# Patient Record
Sex: FEMALE | NOT HISPANIC OR LATINO | Employment: FULL TIME | URBAN - METROPOLITAN AREA
[De-identification: names, ages, dates, MRNs, and addresses within clinical notes are randomized per-mention and may not be internally consistent; named-entity substitution may affect disease eponyms.]

---

## 2023-07-11 ENCOUNTER — APPOINTMENT (OUTPATIENT)
Dept: LAB | Facility: LAB | Age: 28
End: 2023-07-11
Payer: COMMERCIAL

## 2024-01-03 ENCOUNTER — ANCILLARY PROCEDURE (OUTPATIENT)
Dept: RADIOLOGY | Facility: CLINIC | Age: 29
End: 2024-01-03
Payer: COMMERCIAL

## 2024-01-03 DIAGNOSIS — N64.4 MASTODYNIA: ICD-10-CM

## 2024-01-03 PROCEDURE — 76982 USE 1ST TARGET LESION: CPT | Mod: 50

## 2024-01-03 PROCEDURE — 76983 USE EA ADDL TARGET LESION: CPT

## 2024-01-03 PROCEDURE — 76642 ULTRASOUND BREAST LIMITED: CPT | Mod: BILATERAL PROCEDURE | Performed by: RADIOLOGY

## 2024-01-03 PROCEDURE — 76642 ULTRASOUND BREAST LIMITED: CPT | Mod: 50

## 2024-01-24 ENCOUNTER — APPOINTMENT (OUTPATIENT)
Dept: OPHTHALMOLOGY | Facility: CLINIC | Age: 29
End: 2024-01-24
Payer: COMMERCIAL

## 2024-02-13 ENCOUNTER — OFFICE VISIT (OUTPATIENT)
Dept: PRIMARY CARE | Facility: CLINIC | Age: 29
End: 2024-02-13
Payer: COMMERCIAL

## 2024-02-13 VITALS
HEIGHT: 64 IN | DIASTOLIC BLOOD PRESSURE: 83 MMHG | HEART RATE: 67 BPM | SYSTOLIC BLOOD PRESSURE: 128 MMHG | BODY MASS INDEX: 24.55 KG/M2 | WEIGHT: 143.8 LBS | TEMPERATURE: 98 F

## 2024-02-13 DIAGNOSIS — G47.8 UNHEALTHY SLEEP HABIT: ICD-10-CM

## 2024-02-13 DIAGNOSIS — Z00.00 ROUTINE GENERAL MEDICAL EXAMINATION AT A HEALTH CARE FACILITY: ICD-10-CM

## 2024-02-13 DIAGNOSIS — R53.83 OTHER FATIGUE: ICD-10-CM

## 2024-02-13 DIAGNOSIS — E55.9 VITAMIN D DEFICIENCY: ICD-10-CM

## 2024-02-13 DIAGNOSIS — Z20.2 STD EXPOSURE: Primary | ICD-10-CM

## 2024-02-13 PROCEDURE — 99395 PREV VISIT EST AGE 18-39: CPT | Performed by: FAMILY MEDICINE

## 2024-02-13 PROCEDURE — 87800 DETECT AGNT MULT DNA DIREC: CPT

## 2024-02-13 ASSESSMENT — PROMIS GLOBAL HEALTH SCALE
RATE_MENTAL_HEALTH: GOOD
EMOTIONAL_PROBLEMS: SOMETIMES
RATE_SOCIAL_SATISFACTION: GOOD
RATE_AVERAGE_FATIGUE: MODERATE
CARRYOUT_PHYSICAL_ACTIVITIES: MOSTLY
RATE_QUALITY_OF_LIFE: GOOD
RATE_GENERAL_HEALTH: FAIR
RATE_PHYSICAL_HEALTH: FAIR
CARRYOUT_SOCIAL_ACTIVITIES: FAIR
RATE_AVERAGE_PAIN: 4

## 2024-02-13 NOTE — PROGRESS NOTES
"Subjective   Patient ID: Cheyanne Henriquez is a 28 y.o. female who presents for Annual Exam.    HPI     Review of Systems   All other systems reviewed and are negative.      Objective   /83   Pulse 67   Temp 36.7 °C (98 °F)   Ht 1.626 m (5' 4\")   Wt 65.2 kg (143 lb 12.8 oz)   BMI 24.68 kg/m²     Physical Exam  Cardiovascular:      Rate and Rhythm: Normal rate.   Pulmonary:      Breath sounds: Normal breath sounds.   Abdominal:      Palpations: Abdomen is soft.   Musculoskeletal:         General: Normal range of motion.      Cervical back: Normal range of motion.   Skin:     General: Skin is warm.   Neurological:      General: No focal deficit present.      Mental Status: She is alert.   Psychiatric:         Mood and Affect: Mood normal.     Assessment/Plan     This is a 28-year-old female patient who is here for her annual physical    She is being followed for bilateral breast cyst    Most recent ultrasound shows a cyst on her right breast which she will be following up with her gynecologist for repeat ultrasound of her right breast    She says she is feeling fatigue    On further questioning I found out that she has very erratic sleep schedule referred her to sleep psychology to learn about healthy sleep habits    Will check her routine lab work including the STD labs  She will go to [facility] to get her blood drawn but here we will send the urine for chlamydia and gonorrhea    Advised on safe sex barrier techniques    Advised her to exercise more regularly    Advised her to come back in 3 to 6 months         "

## 2024-02-15 LAB
C TRACH RRNA SPEC QL NAA+PROBE: NEGATIVE
N GONORRHOEA DNA SPEC QL PROBE+SIG AMP: NEGATIVE

## 2024-02-19 ENCOUNTER — LAB (OUTPATIENT)
Dept: LAB | Facility: LAB | Age: 29
End: 2024-02-19
Payer: COMMERCIAL

## 2024-02-19 DIAGNOSIS — Z00.00 ROUTINE GENERAL MEDICAL EXAMINATION AT A HEALTH CARE FACILITY: ICD-10-CM

## 2024-02-19 DIAGNOSIS — E55.9 VITAMIN D DEFICIENCY: ICD-10-CM

## 2024-02-19 DIAGNOSIS — R53.83 OTHER FATIGUE: ICD-10-CM

## 2024-02-19 DIAGNOSIS — Z20.2 STD EXPOSURE: ICD-10-CM

## 2024-02-19 LAB
25(OH)D3 SERPL-MCNC: 21 NG/ML (ref 30–100)
ALBUMIN SERPL BCP-MCNC: 4.3 G/DL (ref 3.4–5)
ALP SERPL-CCNC: 46 U/L (ref 33–110)
ALT SERPL W P-5'-P-CCNC: 9 U/L (ref 7–45)
ANION GAP SERPL CALC-SCNC: 12 MMOL/L (ref 10–20)
AST SERPL W P-5'-P-CCNC: 13 U/L (ref 9–39)
BASOPHILS # BLD AUTO: 0.04 X10*3/UL (ref 0–0.1)
BASOPHILS NFR BLD AUTO: 0.6 %
BILIRUB SERPL-MCNC: 0.3 MG/DL (ref 0–1.2)
BUN SERPL-MCNC: 12 MG/DL (ref 6–23)
CALCIUM SERPL-MCNC: 9.8 MG/DL (ref 8.6–10.3)
CHLORIDE SERPL-SCNC: 101 MMOL/L (ref 98–107)
CHOLEST SERPL-MCNC: 145 MG/DL (ref 0–199)
CHOLESTEROL/HDL RATIO: 3.1
CO2 SERPL-SCNC: 28 MMOL/L (ref 21–32)
CREAT SERPL-MCNC: 0.62 MG/DL (ref 0.5–1.05)
EGFRCR SERPLBLD CKD-EPI 2021: >90 ML/MIN/1.73M*2
EOSINOPHIL # BLD AUTO: 0.54 X10*3/UL (ref 0–0.7)
EOSINOPHIL NFR BLD AUTO: 8.5 %
ERYTHROCYTE [DISTWIDTH] IN BLOOD BY AUTOMATED COUNT: 12 % (ref 11.5–14.5)
GLUCOSE SERPL-MCNC: 107 MG/DL (ref 74–99)
HBV SURFACE AG SERPL QL IA: NONREACTIVE
HCT VFR BLD AUTO: 38.6 % (ref 36–46)
HCV AB SER QL: NONREACTIVE
HDLC SERPL-MCNC: 47.1 MG/DL
HGB BLD-MCNC: 12.4 G/DL (ref 12–16)
HIV 1+2 AB+HIV1 P24 AG SERPL QL IA: NONREACTIVE
IMM GRANULOCYTES # BLD AUTO: 0.02 X10*3/UL (ref 0–0.7)
IMM GRANULOCYTES NFR BLD AUTO: 0.3 % (ref 0–0.9)
LDLC SERPL CALC-MCNC: 67 MG/DL
LYMPHOCYTES # BLD AUTO: 2.17 X10*3/UL (ref 1.2–4.8)
LYMPHOCYTES NFR BLD AUTO: 34.3 %
MCH RBC QN AUTO: 31.9 PG (ref 26–34)
MCHC RBC AUTO-ENTMCNC: 32.1 G/DL (ref 32–36)
MCV RBC AUTO: 99 FL (ref 80–100)
MONOCYTES # BLD AUTO: 0.53 X10*3/UL (ref 0.1–1)
MONOCYTES NFR BLD AUTO: 8.4 %
NEUTROPHILS # BLD AUTO: 3.02 X10*3/UL (ref 1.2–7.7)
NEUTROPHILS NFR BLD AUTO: 47.9 %
NON HDL CHOLESTEROL: 98 MG/DL (ref 0–149)
NRBC BLD-RTO: 0 /100 WBCS (ref 0–0)
PLATELET # BLD AUTO: 429 X10*3/UL (ref 150–450)
POTASSIUM SERPL-SCNC: 4.5 MMOL/L (ref 3.5–5.3)
PROT SERPL-MCNC: 6.9 G/DL (ref 6.4–8.2)
RBC # BLD AUTO: 3.89 X10*6/UL (ref 4–5.2)
SODIUM SERPL-SCNC: 136 MMOL/L (ref 136–145)
TRIGL SERPL-MCNC: 156 MG/DL (ref 0–149)
TSH SERPL-ACNC: 1.58 MIU/L (ref 0.44–3.98)
VLDL: 31 MG/DL (ref 0–40)
WBC # BLD AUTO: 6.3 X10*3/UL (ref 4.4–11.3)

## 2024-02-19 PROCEDURE — 85025 COMPLETE CBC W/AUTO DIFF WBC: CPT

## 2024-02-19 PROCEDURE — 87389 HIV-1 AG W/HIV-1&-2 AB AG IA: CPT

## 2024-02-19 PROCEDURE — 87340 HEPATITIS B SURFACE AG IA: CPT

## 2024-02-19 PROCEDURE — 84443 ASSAY THYROID STIM HORMONE: CPT

## 2024-02-19 PROCEDURE — 80053 COMPREHEN METABOLIC PANEL: CPT

## 2024-02-19 PROCEDURE — 80061 LIPID PANEL: CPT

## 2024-02-19 PROCEDURE — 82306 VITAMIN D 25 HYDROXY: CPT

## 2024-02-19 PROCEDURE — 86803 HEPATITIS C AB TEST: CPT

## 2024-02-20 ENCOUNTER — OFFICE VISIT (OUTPATIENT)
Dept: OPHTHALMOLOGY | Facility: CLINIC | Age: 29
End: 2024-02-20
Payer: COMMERCIAL

## 2024-02-20 DIAGNOSIS — Z83.511 FAMILY HISTORY OF GLAUCOMA: ICD-10-CM

## 2024-02-20 DIAGNOSIS — H18.523 ANTERIOR BASEMENT MEMBRANE DYSTROPHY OF BOTH EYES: ICD-10-CM

## 2024-02-20 DIAGNOSIS — H52.223 REGULAR ASTIGMATISM OF BOTH EYES: ICD-10-CM

## 2024-02-20 DIAGNOSIS — H52.13 MYOPIA, BILATERAL: ICD-10-CM

## 2024-02-20 DIAGNOSIS — H04.123 DRY EYES: Primary | ICD-10-CM

## 2024-02-20 PROCEDURE — 92015 DETERMINE REFRACTIVE STATE: CPT | Performed by: OPHTHALMOLOGY

## 2024-02-20 PROCEDURE — 92004 COMPRE OPH EXAM NEW PT 1/>: CPT | Performed by: OPHTHALMOLOGY

## 2024-02-20 ASSESSMENT — REFRACTION_MANIFEST
OS_AXIS: 020
OS_CYLINDER: -0.25
OD_CYLINDER: -0.50
OD_SPHERE: -4.50
OD_AXIS: 180
OS_SPHERE: -5.50

## 2024-02-20 ASSESSMENT — SLIT LAMP EXAM - LIDS
COMMENTS: GOOD POSITION
COMMENTS: GOOD POSITION

## 2024-02-20 ASSESSMENT — CUP TO DISC RATIO
OD_RATIO: 0.3
OS_RATIO: 0.3

## 2024-02-20 ASSESSMENT — REFRACTION_WEARINGRX
OD_CYLINDER: -0.75
OD_SPHERE: -4.25
OS_SPHERE: -4.75
OS_AXIS: 020
OS_CYLINDER: -0.75
OD_AXIS: 180

## 2024-02-20 ASSESSMENT — VISUAL ACUITY
METHOD: SNELLEN - LINEAR
OD_CC: 20/20
OS_PH_CC: 20/20
OS_CC: 20/25+

## 2024-02-20 ASSESSMENT — TONOMETRY
OS_IOP_MMHG: 16
OD_IOP_MMHG: 16
IOP_METHOD: GOLDMANN APPLANATION

## 2024-02-20 ASSESSMENT — EXTERNAL EXAM - RIGHT EYE: OD_EXAM: NORMAL

## 2024-02-20 ASSESSMENT — EXTERNAL EXAM - LEFT EYE: OS_EXAM: NORMAL

## 2024-02-20 ASSESSMENT — PACHYMETRY
OS_CT(UM): 547
OD_CT(UM): 548

## 2024-02-20 NOTE — PROGRESS NOTES
Assessment/Plan   Diagnoses and all orders for this visit:  Dry eyes  -Start artificial tears both eyes (OU) qid  -stress compliance    Anterior basement membrane dystrophy of both eyes  continue to monitor    Myopia, bilateral  Regular astigmatism of both eyes  Refractive error  -give Rx for new glasses    Family hx of glaucoma  -pt was advised to have all family members checked for glaucoma and also for pt to have a yearly exam    Return for a dilated exam in   12  months or sooner if having any problems

## 2024-04-08 ENCOUNTER — APPOINTMENT (OUTPATIENT)
Dept: OPHTHALMOLOGY | Facility: CLINIC | Age: 29
End: 2024-04-08
Payer: COMMERCIAL

## 2024-05-08 ENCOUNTER — OFFICE VISIT (OUTPATIENT)
Dept: PRIMARY CARE | Facility: CLINIC | Age: 29
End: 2024-05-08
Payer: COMMERCIAL

## 2024-05-08 VITALS
HEART RATE: 74 BPM | DIASTOLIC BLOOD PRESSURE: 71 MMHG | WEIGHT: 144.2 LBS | BODY MASS INDEX: 24.62 KG/M2 | SYSTOLIC BLOOD PRESSURE: 112 MMHG | TEMPERATURE: 98.3 F | HEIGHT: 64 IN

## 2024-05-08 DIAGNOSIS — R20.0 NUMBNESS AND TINGLING OF LEFT LOWER EXTREMITY: Primary | ICD-10-CM

## 2024-05-08 DIAGNOSIS — R20.2 NUMBNESS AND TINGLING OF LEFT LOWER EXTREMITY: Primary | ICD-10-CM

## 2024-05-08 DIAGNOSIS — R20.2 PARESTHESIA: ICD-10-CM

## 2024-05-08 PROCEDURE — 99214 OFFICE O/P EST MOD 30 MIN: CPT | Performed by: FAMILY MEDICINE

## 2024-05-08 PROCEDURE — 1036F TOBACCO NON-USER: CPT | Performed by: FAMILY MEDICINE

## 2024-05-08 ASSESSMENT — ENCOUNTER SYMPTOMS: NUMBNESS: 1

## 2024-05-08 NOTE — PROGRESS NOTES
"Subjective   Patient ID: Cheyanne Henriquez is a 29 y.o. female who presents for Numbness.    HPI     Review of Systems   Neurological:  Positive for numbness.        Patient for a number of years she used to have stiffness in her legs and she was told that she has bursitis and was given steroid shots    Since her last week she has numbness and tingling of both legs upper thigh area    Also feels heart at certain points of her thighs       Objective   /71   Pulse 74   Temp 36.8 °C (98.3 °F)   Ht 1.626 m (5' 4\")   Wt 65.4 kg (144 lb 3.2 oz)   LMP 04/30/2024   BMI 24.75 kg/m²     Physical Exam  Neurological:      General: No focal deficit present.      Sensory: No sensory deficit.      Coordination: Coordination normal.      Gait: Gait normal.      Deep Tendon Reflexes: Reflexes normal.         Assessment/Plan     This is a 29-year-old female patient who is presenting with numbness and tingling which started 1 week ago she is pointing out to the anterior bilateral thigh areas    Also she is feels sudden warmth of certain areas of her thighs    She does not have symptoms of vertigo    On exam she has no nystagmus Romberg's negative Babinski down    I will refer her to neurology for further exam and possible investigations to rule out any inflammatory neurovascular lesions         "

## 2024-05-14 ENCOUNTER — APPOINTMENT (OUTPATIENT)
Dept: OPHTHALMOLOGY | Facility: CLINIC | Age: 29
End: 2024-05-14
Payer: COMMERCIAL

## 2024-06-06 ENCOUNTER — OFFICE VISIT (OUTPATIENT)
Dept: PODIATRY | Facility: CLINIC | Age: 29
End: 2024-06-06
Payer: COMMERCIAL

## 2024-06-06 DIAGNOSIS — B35.3 TINEA PEDIS OF LEFT FOOT: ICD-10-CM

## 2024-06-06 DIAGNOSIS — L84 CORNS AND CALLOSITIES: ICD-10-CM

## 2024-06-06 DIAGNOSIS — M79.672 PAIN IN BOTH FEET: ICD-10-CM

## 2024-06-06 DIAGNOSIS — M79.671 PAIN IN BOTH FEET: ICD-10-CM

## 2024-06-06 DIAGNOSIS — B35.1 ONYCHOMYCOSIS: Primary | ICD-10-CM

## 2024-06-06 PROCEDURE — 99203 OFFICE O/P NEW LOW 30 MIN: CPT | Performed by: PODIATRIST

## 2024-06-06 PROCEDURE — 1036F TOBACCO NON-USER: CPT | Performed by: PODIATRIST

## 2024-06-06 RX ORDER — TERBINAFINE HYDROCHLORIDE 250 MG/1
250 TABLET ORAL DAILY
Qty: 30 TABLET | Refills: 2 | Status: SHIPPED | OUTPATIENT
Start: 2024-06-06 | End: 2024-08-29

## 2024-06-06 RX ORDER — NORETHINDRONE ACETATE AND ETHINYL ESTRADIOL, AND FERROUS FUMARATE 1.5-30(21)
1 KIT ORAL DAILY
COMMUNITY

## 2024-06-06 NOTE — PROGRESS NOTES
Chief Complaint   Patient presents with    Nail Problem     New Patient is here today with nail fungus left hallux.  employee. Saw Dr. He in 2020 and did a nail culture, but never followed up.  Having some skin problems, especially dryness and peeling   HPI: C/O thick L hallux nail x years.  Had biopsy in 2020 but was never notified of the results.  Nail grows out thickened crumbly falls often starts over the same way.  Patient also complains of itchy skin on the left foot.  Patient states her feet sweat during the day and crocks and tries to dry well.  Complains of painful skin lesion on the fifth digits bilaterally.  She tries to exfoliate the skin.    PMH, PSx, Medications and allergies reviewed.  ROS negative except for what is stated in HPI.    Physical Exam  Patient alert, oriented, no acute distress    VASC: +2/4 pedal pulses B/L.  CFT brisk all digits.  Skin temperature is warm to warm proximal distal B/L..  (+)hair growth B/L.  No edema noted B/L    NEURO: Vibratory intact B/L.  Light touch intact B/L.     DERM:Nail 1L has been avulsed.  There is a minimal nail plate noted down by the cuticle which is thick crumbly and has subungual debris.  Underlying nailbed is intact.  Remaining nails are painted.  Right hallux nail does appear to be thick.  There is erythematous scaly skin in a moccasin distribution on the left foot.  There is also macerated webspaces along with peeling skin 1 through 4 left foot.  No cellulitis noted. Pre-ulcerative, painful, hyperkeratotic lesions located: Dorsal fifth digits B/L     MUSCULOSKEL: +5/5 muscle strength B/L.    Pes planus noted B/L  Full ankle and foot joint range of motion's without crepitus or pain B/L  Adductovarus fifth digits noted B/L    Assessment and Plan  #1  Onychomycosis  Discussed pathology and treatment options  Reviewed LFTs  Rx: Lamisil 150 mg 1 tablet p.o. daily x 90 days  Patient to repeat LFTs in 6 weeks  Did discuss the possibility of liver  dysfunction secondary to medication  Follow-up 3 months    #2 pedis left foot  Discussed pathology and treatment options  Proper hygiene discussed for socks and shoes  Recommend all shoes to be sanitized can use UV light 's or disinfecting them with bleach  All sock should be washed with a bleach or Lysol laundry product to kill the fungus  This will be treated with oral Lamisil should see results within 2 weeks  Can use a foot antiperspirant once the tinea pedis resolves to decrease the sweating  Follow-up 3 months    #3 callus fifth digits bilaterally  Discussed pathology and treatment options  Paring of all hyperkeratotic tissue with a 15 blade without complications  To recommend OTC gel sleeve padding  Patient to make sure that her shoes are wide enough and deep enough  Follow-up as needed

## 2024-07-08 ENCOUNTER — APPOINTMENT (OUTPATIENT)
Dept: RADIOLOGY | Facility: CLINIC | Age: 29
End: 2024-07-08
Payer: COMMERCIAL

## 2024-08-01 ENCOUNTER — APPOINTMENT (OUTPATIENT)
Dept: PODIATRY | Facility: CLINIC | Age: 29
End: 2024-08-01
Payer: COMMERCIAL

## 2024-08-20 ENCOUNTER — APPOINTMENT (OUTPATIENT)
Dept: PRIMARY CARE | Facility: CLINIC | Age: 29
End: 2024-08-20
Payer: COMMERCIAL

## 2024-08-22 ENCOUNTER — OFFICE VISIT (OUTPATIENT)
Dept: NEUROLOGY | Facility: CLINIC | Age: 29
End: 2024-08-22
Payer: COMMERCIAL

## 2024-08-22 VITALS
SYSTOLIC BLOOD PRESSURE: 105 MMHG | RESPIRATION RATE: 18 BRPM | BODY MASS INDEX: 24.92 KG/M2 | HEIGHT: 64 IN | WEIGHT: 146 LBS | DIASTOLIC BLOOD PRESSURE: 73 MMHG | HEART RATE: 55 BPM

## 2024-08-22 DIAGNOSIS — R20.2 PARESTHESIA: ICD-10-CM

## 2024-08-22 DIAGNOSIS — R20.0 NUMBNESS AND TINGLING OF LEFT LOWER EXTREMITY: ICD-10-CM

## 2024-08-22 DIAGNOSIS — G57.11 MERALGIA PARAESTHETICA, RIGHT: Primary | ICD-10-CM

## 2024-08-22 DIAGNOSIS — R20.2 NUMBNESS AND TINGLING OF LEFT LOWER EXTREMITY: ICD-10-CM

## 2024-08-22 PROCEDURE — 3008F BODY MASS INDEX DOCD: CPT | Performed by: PSYCHIATRY & NEUROLOGY

## 2024-08-22 PROCEDURE — 99204 OFFICE O/P NEW MOD 45 MIN: CPT | Performed by: PSYCHIATRY & NEUROLOGY

## 2024-08-22 PROCEDURE — 99214 OFFICE O/P EST MOD 30 MIN: CPT | Mod: GC | Performed by: PSYCHIATRY & NEUROLOGY

## 2024-08-22 ASSESSMENT — PATIENT HEALTH QUESTIONNAIRE - PHQ9
1. LITTLE INTEREST OR PLEASURE IN DOING THINGS: NOT AT ALL
2. FEELING DOWN, DEPRESSED OR HOPELESS: NOT AT ALL
SUM OF ALL RESPONSES TO PHQ9 QUESTIONS 1 AND 2: 0

## 2024-08-22 ASSESSMENT — PAIN SCALES - GENERAL: PAINLEVEL: 0-NO PAIN

## 2024-08-22 ASSESSMENT — ENCOUNTER SYMPTOMS
LOSS OF SENSATION IN FEET: 0
DEPRESSION: 0
OCCASIONAL FEELINGS OF UNSTEADINESS: 0

## 2024-08-22 ASSESSMENT — COLUMBIA-SUICIDE SEVERITY RATING SCALE - C-SSRS
1. IN THE PAST MONTH, HAVE YOU WISHED YOU WERE DEAD OR WISHED YOU COULD GO TO SLEEP AND NOT WAKE UP?: NO
2. HAVE YOU ACTUALLY HAD ANY THOUGHTS OF KILLING YOURSELF?: NO
6. HAVE YOU EVER DONE ANYTHING, STARTED TO DO ANYTHING, OR PREPARED TO DO ANYTHING TO END YOUR LIFE?: NO

## 2024-08-22 NOTE — PROGRESS NOTES
Neuromuscular Medicine Consult     Cheyanne Henriquez, MRN: [id number], : 1995  Reason for Referral: No chief complaint on file.     Referring Provider: Suki Benitez  Primary Care Physician: Suki Benitez MD     Impression/Plan:   Ms. Henriquez is a 29 y.o. right handed female who presents for evaluation of sensory abnormalities involving the right lower extremity.  She reported right anterior lateral thigh numbness and warm sensation with some paresthesia that started in 2016, and has been progressive since.  Denies any recent pregnancies, weight gain, tight belts/pants, or aggravating factors.  She also reported 6-month history of left anterolateral thigh numbness with burning pain similar pattern/distribution, but less severe in nature.  Her examination is is consistent with neuropathy involving right the anterior lateral thigh supplied by the lateral femoral cutaneous nerve (meralgia paresthetica).  Unclear etiology of this stage, will complete diabetic workup for further evaluation.    Plan:  -Instructed to use lidocaine patch as needed  -Plan for oral glucose tolerance test 3 hours  -Serum HbA1c    Manfred Pena MD  Neuromuscular Fellow     ATTENDING NOTE - KALYAN YANES M.D.    I saw patient with trainee and agree with the edits, history and exam that I helped formulate per above.    She describes pain and numbness in the right lateral thigh since 2016 2 years after she delivered her baby.  The symptoms have persisted since and were waxing and waning.  The symptoms are worse when she is standing and walking.  She has had recent small milder but similar symptoms on the left.  She had a recent injection in the right greater trochanteric bursa with no effect on the numbness and burning.  She has no specific triggering factor and denies weight gain, diabetes, trauma to the area or injections.  She has a strong family history for diabetes mellitus.    Her neurological examination is  pertinent for a BMI of 25.  She has a well-circumscribed area of sensory loss in the distribution of the right lateral cutaneous nerve of the thigh.  Her reflexes and muscle power were normal.    In summary, she has classical findings of right meralgia paresthetica with no clear triggering cause/risk factor.  We will obtain blood work to exclude diabetes and prediabetes.  We suggested to use lidocaine patch for 12 hours a day over the area for relief of discomfort.  We will call patient with results.  We will see her accordingly.    Lny Holly M.D., [email or web address].   Director, Neuromuscular Center & EMG laboratory   [facility]   [facility]   Professor of Neurology   [facility] [employer]    The total appointment time today was 50 minutes. Time included preparing to see the patient, obtaining the history, performing a medically necessary appropriate physical examination, counseling and educating the patient, ordering medications and tests and documenting clinical information in the medical record.      History of Present Illness:    Ms. Henriquez is a 29 y.o. right handed female who presents for evaluation of sensory abnormalities involving the bilateral lower extremities.     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  Felt it mainly in the R hip radiating to above the knee. Feels like warm sensation in the anterolateral thigh but also numb. Endorsed Intermittent paresthesia. Over time, it became more pronounced and started taking OTC ibuprofen with some relief. Denies any skin changes or swelling.     Over the last 6 months, she endorsed similar pattern of sensory abnormalities in the left anterolateral thigh, but less severe. She received steroid injection to the R hip for bursitis with minimal relief.  However, she takes Ibuprofen PRN with some relief.      Denies any recent change in weight, recent pregnancies, wearing tight clothes. Tried lidocaine patch intermittently with some relief.     Relevant past medical, surgical, family, and social histories, along with ROS was reviewed and pertinent details noted above.     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  - Denies tobacco use  - Occasional Alcohol use   - Denies recreational drug use    Past Medical History:   Diagnosis Date    Acute recurrent maxillary sinusitis 09/11/2019    Recurrent maxillary sinusitis    Chalazion right upper eyelid 01/14/2016    Chalazion of right upper eyelid    Encounter for screening for malignant neoplasm of vagina     Vaginal Pap smear    Injury of conjunctiva and corneal abrasion without foreign body, right eye, initial encounter 01/14/2016    Abrasion of cornea, right    Other conditions influencing health status     History of pregnancy    Other conditions influencing health status 06/27/2014    History of pregnancy    Other specified health status 09/16/2014    Contraception    Otitis media, unspecified, right ear 09/11/2019    Acute right otitis media    Personal history of other (healed) physical injury and trauma 01/14/2016    History of corneal abrasion    Personal history of other diseases of the respiratory system 06/25/2020    History of recurrent tonsillitis    Personal history of other endocrine, nutritional and metabolic disease 06/25/2020    History of goiter    Personal history of other medical treatment     H/O mammogram    Personal history of other medical treatment     H/O bone density study    Tinea unguium 06/05/2020    Toenail fungus    Trochanteric bursitis, right hip 10/12/2021    Greater trochanteric bursitis, right    Unspecified acute conjunctivitis, left eye 10/02/2017    Acute bacterial  conjunctivitis of left eye     Past Surgical History:   Procedure Laterality Date    COLONOSCOPY  03/17/2014    Complete Colonoscopy     No family history on file.  Social History     Tobacco Use    Smoking status: Never    Smokeless tobacco: Never   Substance Use Topics    Alcohol use: Yes     Comment: social      No Known Allergies     Medications:    Current Outpatient Medications:     Junel FE 1.5/30, 28, 1.5 mg-30 mcg (21)/75 mg (7) tablet, Take 1 tablet by mouth once daily., Disp: , Rfl:     terbinafine (LamISIL) 250 mg tablet, Take 1 tablet (250 mg) by mouth once daily., Disp: 30 tablet, Rfl: 2       Physical Exam:     Vitals:    08/22/24 0844   BP: 105/73   Pulse: 55   Resp: 18        General Appearance:  No distress, alert, interactive and cooperative.     Skin: No rash present on limbs or extensor surfaces    Musculoskeletal: Positive Ceasar's test on the right side, with significant pain.    Neurological:  Mental status: the patient provided an accurate history, language was normal.   Cranial Nerves:  CN 2   Visual fields full to confrontation.   CN 3, 4, 6   Pupils round, 4 mm in diameter, equally reactive to light.   Lids symmetric; no ptosis.   EOMs normal alignment, full range, with normal pursuit and convergence  No nystagmus.   CN 5   Facial sensation intact bilaterally.   Jaw opening 5/5   CN 7   Normal and symmetric facial strength. Nasolabial folds symmetric.   Able to lift eyebrows and close eye lids with eye lashes buried symmetrically.   CN 8   Hearing intact to conversation.     CN 9, 10   Palate elevates symmetrically.   Phonation within normal limits, no dysarthria.   CN 11   Normal strength of shoulder shrug and neck turning.   CN 12   Tongue midline, with normal bulk and strength; no fasciculations.     Motor:   Muscle bulk: Normal throughout.  Muscle tone: Normal in both upper and lower extremities.  Movements: No fasciculations, tremors, or other abnormal movement.     Manual Muscle  "Testing (MMT) reveals the following MRC grades:  Neck Flexion 5  Neck Extension 5  R L   Shoulder abduction  5 5  Elbow flexion   5 5  Elbow extension  5 5  Finger flexion   5 5  Finger extension  5 5  Finger abduction  5 5  Thumb abduction   5 5  Hip flexion   5 5  Hip extension   5 5  Hip abduction    5 5  Hip adduction    5 5  Knee flexion   5 5  Knee extension  5 5  Ankle dorsiflexion  5 5  Ankle plantarflexion  5 5  Ankle Inversion   5 5  Ankle Eversion   5 5  Big toe extension  5 5  Toe flexion   5 5    Reflexes:     R          L  BR:               2          2  Biceps:         2          2  Triceps:        2          2  Knee:           3          3  Ankle:          2          2    Babinski: Toes are down going  No clonus or other pathological reflexes      Sensory:   In bilateral upper extremities, intact sensation to light touch, to vibration, temperature and pinprick.    Bilateral lower extremities, reduced sensation to temperature and pinprick within a demarcated area drawn by patient covering the anterior lateral thigh (around 25% reduction in sensation) that is supplied by the lateral femoral cutaneous nerve.  Remaining sensation intact to light touch, pinprick, vibration and temperature in the bilateral lower extremities.    Coordination:    In both upper extremities, finger-nose-finger was intact without dysmetria or overshoot.   RADHA were intact in both upper and lower extremities.      Gait:   Station was stable with a normal base. Gait was stable with a normal arm swing and speed. No ataxia, shuffling, steppage or waddling was present. No circumduction was present.   Tandem gait was intact.   No Romberg sign was present.     Results:     The following labs, imaging, and/or data were personally reviewed and demonstrated:   No results found for: \"HGBA1C\"     Lab Results   Component Value Date    CREATININE 0.62 02/19/2024    BUN 12 02/19/2024     02/19/2024    K 4.5 02/19/2024     " 02/19/2024    CO2 28 02/19/2024      Lab Results   Component Value Date    WBC 6.3 02/19/2024    HGB 12.4 02/19/2024    HCT 38.6 02/19/2024    MCV 99 02/19/2024     02/19/2024      Lab Results   Component Value Date    TSH 1.58 02/19/2024

## 2024-09-11 ENCOUNTER — APPOINTMENT (OUTPATIENT)
Dept: PRIMARY CARE | Facility: CLINIC | Age: 29
End: 2024-09-11
Payer: COMMERCIAL

## 2024-10-01 ENCOUNTER — APPOINTMENT (OUTPATIENT)
Dept: PRIMARY CARE | Facility: CLINIC | Age: 29
End: 2024-10-01
Payer: COMMERCIAL

## 2024-10-01 VITALS
WEIGHT: 147.4 LBS | BODY MASS INDEX: 25.16 KG/M2 | SYSTOLIC BLOOD PRESSURE: 115 MMHG | HEART RATE: 75 BPM | TEMPERATURE: 97.5 F | HEIGHT: 64 IN | DIASTOLIC BLOOD PRESSURE: 76 MMHG

## 2024-10-01 DIAGNOSIS — R20.2 PARESTHESIA: ICD-10-CM

## 2024-10-01 DIAGNOSIS — N63.0 MASS OF BREAST, UNSPECIFIED LATERALITY: Primary | ICD-10-CM

## 2024-10-01 PROCEDURE — 99213 OFFICE O/P EST LOW 20 MIN: CPT | Performed by: FAMILY MEDICINE

## 2024-10-01 PROCEDURE — 1036F TOBACCO NON-USER: CPT | Performed by: FAMILY MEDICINE

## 2024-10-01 PROCEDURE — 3008F BODY MASS INDEX DOCD: CPT | Performed by: FAMILY MEDICINE

## 2024-10-01 NOTE — PROGRESS NOTES
I saw and evaluated the patient. I personally obtained the key and critical portions of the history and physical exam. I reviewed the student 's documentation and discussed the patient with the student. I agree with the student medical decision making as documented in the student's note    Patient had seen a midwife early part of this year for breast pain had ordered ultrasound of the breast    The radiologist had recommended to have repeat ultrasound of her right breast    She requested for me to write diagnostic mammogram along with the bilateral ultrasound of the breast as well as I referred her to breast specialist    She was seen by the neurologist for the numbness of her right leg and was diagnosed with meralgia paresthetica and she is reassured

## 2024-10-01 NOTE — PROGRESS NOTES
Patient is here for a referral to a breast specialist and to get an order for diagnostic mammogram as well as a bilateral US breast.     Patient was seen in December by her midwife at [facility] for breast pain. An US in January showed mass in right breast and repeat US was ordered for 6 month follow up. Due to conflicting EMR systems in  and [facility], they could not schedule her appointment, as she works at [employer] and would like to have work up done there.    Orders placed.     Message sent to patient reminding her to schedule a AWV in February and to take 2000iu of Vitamin D.

## 2024-10-03 NOTE — PROGRESS NOTES
"    Cheyanne Henriquez female   1995 29 y.o.   [id number]      Chief Complaint    New Patient Visit          HPI  Cheyanne Henriquez \"Slime\" is a 29 y.o. 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 Her breast pain began fall . She describes pain as daily aching pain rated 6 on 0-10 paint scale but can increase up to 10 on 0-10 pain scale around her menstrual cycle. She drink caffeine when she works and adds extra shot of caffeine. She denies smoking or marijuana. She has family history of breast cancer in maternal grandmother in her 80s.     Her son is 10 and she was unable to  him due to pain with latch. She is not on birth control and not currently with a partner.     BREAST IMAGIN/3/2024 bilateral breast ultrasound, BI-RADS Category 3, left breast 4:00 6cm from nipple 5 x 2 x 4mm benign cyst, right breast 9:00 6cm from nipple 8 x 4 x 6mm probable benign mass, 6 month follow up ultrasound recommended.     REPRODUCTIVE HISTORY: menarche age 15, , first birth age 19, premenopausal with regular periods     FAMILY CANCER HISTORY:   Maternal grandmother: breast cancer in her 80s  Maternal grandfather: bone cancer in his 80s  Maternal Uncle: lung cancer in her 70s      REVIEW OF SYSTEMS    Constitutional:  Negative for appetite change, fatigue, fever and unexpected weight change.   HENT:  Negative for ear pain, hearing loss, nosebleeds, sore throat and trouble swallowing.    Eyes:  Negative for discharge, itching and visual disturbance.   Respiratory:  Negative for cough, chest tightness and shortness of breath.    Cardiovascular:  Negative for chest pain, palpitations and leg swelling.   Breast: as indicated in HPI  Gastrointestinal:  Negative for abdominal pain, constipation, diarrhea and nausea. "   Endocrine: Negative for cold intolerance and heat intolerance.   Genitourinary:  Negative for dysuria, frequency, hematuria, pelvic pain and vaginal bleeding.   Musculoskeletal:  Negative for arthralgias, back pain, gait problem, joint swelling and myalgias.   Skin:  Negative for color change and rash.   Allergic/Immunologic: Negative for environmental allergies and food allergies.   Neurological:  Negative for dizziness, tremors, speech difficulty, weakness, numbness and headaches.   Hematological:  Does not bruise/bleed easily.   Psychiatric/Behavioral:  Negative for agitation, dysphoric mood and sleep disturbance. The patient is not nervous/anxious.         MEDICATIONS  No current outpatient medications       ALLERGIES  No Known Allergies     There are no problems to display for this patient.    Past Medical History:   Diagnosis Date    Acute recurrent maxillary sinusitis 09/11/2019    Recurrent maxillary sinusitis    Chalazion right upper eyelid 01/14/2016    Chalazion of right upper eyelid    Encounter for screening for malignant neoplasm of vagina     Vaginal Pap smear    Injury of conjunctiva and corneal abrasion without foreign body, right eye, initial encounter 01/14/2016    Abrasion of cornea, right    Other conditions influencing health status     History of pregnancy    Other conditions influencing health status 06/27/2014    History of pregnancy    Other specified health status 09/16/2014    Contraception    Otitis media, unspecified, right ear 09/11/2019    Acute right otitis media    Personal history of other (healed) physical injury and trauma 01/14/2016    History of corneal abrasion    Personal history of other diseases of the respiratory system 06/25/2020    History of recurrent tonsillitis    Personal history of other endocrine, nutritional and metabolic disease 06/25/2020    History of goiter    Personal history of other medical treatment     H/O mammogram    Personal history of other medical  treatment     H/O bone density study    Tinea unguium 06/05/2020    Toenail fungus    Trochanteric bursitis, right hip 10/12/2021    Greater trochanteric bursitis, right    Unspecified acute conjunctivitis, left eye 10/02/2017    Acute bacterial conjunctivitis of left eye      Past Surgical History:   Procedure Laterality Date    COLONOSCOPY  03/17/2014    Complete Colonoscopy      Family History   Problem Relation Name Age of Onset    Diabetes Mother Ana Cristina Henriquez     Diabetes Father      Diabetes Brother Sher Saenz     Lung cancer Father's Brother      Breast cancer Maternal Grandmother Cheyanne Saenz     Bone cancer Maternal Grandfather      Stroke Paternal Grandmother Kim Henriquez           SOCIAL HISTORY      Social History     Tobacco Use    Smoking status: Never    Smokeless tobacco: Never   Substance Use Topics    Alcohol use: Yes     Alcohol/week: 1.0 standard drink of alcohol     Types: 1 Glasses of wine per week     Comment: Not every week        VITALS  Vitals:    10/07/24 1351   BP: 115/66   Pulse: 66   Temp: 36.6 °C (97.9 °F)        PHYSICAL EXAM  Patient is alert and oriented x3, with appropriate mood. The gait is steady and hand grasps are equal. Sclera clear. The breasts are nearly symmetrical. The tissue is soft without palpable abnormalities, discrete nodules or masses. The skin and nipples appear normal. There is no cervical, supraclavicular, or axillary lymphadenopathy palpable. Heart rate and rhythm normal, S1 and S2 appreciated. The lungs are clear bilaterally. Abdomen is soft & non-tender.    Physical Exam     IMAGING  BI US breast limited bilateral 10/07/2024    Narrative  Interpreted By:  Ceasar Zaragoza,  STUDY:  BI US BREAST LIMITED BILATERAL;  10/7/2024 11:45 am    ACCESSION NUMBER(S):  [id number]    ORDERING CLINICIAN:  MARCO LUQUE    INDICATION:  The patient presents for initial short-term follow-up for probably  benign right breast mass. The patient reports left breast  pain.    ,N63.0 Unspecified lump in unspecified breast    COMPARISON:  01/03/2020    FINDINGS:  Targeted ultrasound of the bilateral breasts was performed by a  registered sonographer with elastography.    Right breast:  Extensive scanning of the 9:00 position 6 cm from the nipple at the  site of the patient's previously described probably benign mass  demonstrates no focal abnormality or suspicious findings. Dense  fibroglandular tissue is noted at this location.    At the 9 o'clock position 5 cm from the nipple a benign anechoic  avascular cyst is incidentally seen. No further imaging follow-up is  required.    Left breast:  Extensive scanning of the entire inferior breast at the site of the  patient's pain demonstrates no focal abnormality or suspicious  findings.    Impression  The previously described probably benign right breast mass is not  definitively seen on today's examination. No further imaging  follow-up is required.    No sonographic correlate for the patient's left breast pain. Clinical  follow-up is recommended.      BI-RADS Category:  2 Benign.  Recommendation:  Clinical Follow-up and Continued Annual Screening.  Recommended Date:  Age 40 or based on Risk-Assessment.  Laterality:  Bilateral.    Time was spent viewing digital images of the radiology testing with the patient. I explained the results in depth, along with suggested explanation for follow up recommendations based on the testing results.          ORDERS  No orders of the defined types were placed in this encounter.         ASSESSMENT/PLAN  1. Chronic breast pain        2. Mass of breast, unspecified laterality  Referral to General Surgery             Follow up as needed.  I am sorry you are experiencing breast pain. Breast pain is common in women of all ages. Be reassured most breast pain resolves without intervention and breast cancer usually does not cause breast pain. Your breast imaging and exam are normal.    Below is a list of  interventions to help reduce or manage the painful symptoms:  Reduce or eliminate daily caffeine intake especially in the form of coffee, tea, chocolate, carbonated sodas and energy drinks  Reduce dietary fat intake to less than 15% of total calories or approximately 50g per day  Evening primrose oil as an over the counter supplement may be helpful. It is an antioxidant. Take 3g orally per day. It may take 3-4 months to notice a difference  If you are a smoker, stop smoking. You can call 2-939CinepapayaQUIT-NOW for the [place] tobacco quit line support. If you do not smoke, but are exposed to smokers, avoid secondhand smoke  Wear a proper fitting and supportive bra without wire  Compresses may be helpful. Apply warm compresses, ice packs or gentle massage  Medical Therapy: Over the counter Acetaminophen or a nonsteroidal anti-inflammatory drug such as Ibuprofen can be helpful as needed    You can view your health information, review clinical summaries from office visits and test results online when you follow your health with My Chart personal health care record. To sign up, go to [email or web address].     My office phone number is [phone] if you need to get in touch with me or have additional questions or concerns. Thank you for choosing [facility] and trusting me as your healthcare provider. I look forward to seeing you again at your next office visit. I am honored to be a provider on your health care team and I remain dedicated to helping you achieve your health goals.       Natacha Christina, [facility]

## 2024-10-07 ENCOUNTER — HOSPITAL ENCOUNTER (OUTPATIENT)
Dept: RADIOLOGY | Facility: CLINIC | Age: 29
Discharge: HOME | End: 2024-10-07
Payer: COMMERCIAL

## 2024-10-07 ENCOUNTER — HOSPITAL ENCOUNTER (OUTPATIENT)
Dept: RADIOLOGY | Facility: CLINIC | Age: 29
End: 2024-10-07
Payer: COMMERCIAL

## 2024-10-07 ENCOUNTER — OFFICE VISIT (OUTPATIENT)
Dept: SURGICAL ONCOLOGY | Facility: CLINIC | Age: 29
End: 2024-10-07
Payer: COMMERCIAL

## 2024-10-07 VITALS
HEART RATE: 66 BPM | WEIGHT: 148 LBS | DIASTOLIC BLOOD PRESSURE: 66 MMHG | BODY MASS INDEX: 25.4 KG/M2 | TEMPERATURE: 97.9 F | SYSTOLIC BLOOD PRESSURE: 115 MMHG

## 2024-10-07 DIAGNOSIS — N63.0 MASS OF BREAST, UNSPECIFIED LATERALITY: ICD-10-CM

## 2024-10-07 DIAGNOSIS — G89.29 CHRONIC BREAST PAIN: Primary | ICD-10-CM

## 2024-10-07 DIAGNOSIS — N64.4 CHRONIC BREAST PAIN: Primary | ICD-10-CM

## 2024-10-07 PROCEDURE — 1036F TOBACCO NON-USER: CPT | Performed by: NURSE PRACTITIONER

## 2024-10-07 PROCEDURE — 99213 OFFICE O/P EST LOW 20 MIN: CPT | Performed by: NURSE PRACTITIONER

## 2024-10-07 PROCEDURE — 76642 ULTRASOUND BREAST LIMITED: CPT | Mod: 50

## 2024-10-07 PROCEDURE — 76642 ULTRASOUND BREAST LIMITED: CPT | Mod: BILATERAL PROCEDURE | Performed by: STUDENT IN AN ORGANIZED HEALTH CARE EDUCATION/TRAINING PROGRAM

## 2024-10-07 PROCEDURE — 99203 OFFICE O/P NEW LOW 30 MIN: CPT | Performed by: NURSE PRACTITIONER

## 2024-10-07 PROCEDURE — 76981 USE PARENCHYMA: CPT | Mod: 50

## 2024-10-07 ASSESSMENT — PAIN SCALES - GENERAL: PAINLEVEL: 7

## 2024-10-07 NOTE — PATIENT INSTRUCTIONS
I am sorry you are experiencing breast pain. Breast pain is common in women of all ages. Be reassured most breast pain resolves without intervention and breast cancer usually does not cause breast pain. Your breast imaging and exam are normal.    Below is a list of interventions to help reduce or manage the painful symptoms:  Reduce or eliminate daily caffeine intake especially in the form of coffee, tea, chocolate, carbonated sodas and energy drinks  Reduce dietary fat intake to less than 15% of total calories or approximately 50g per day  Evening primrose oil as an over the counter supplement may be helpful. It is an antioxidant. Take 3g orally per day. It may take 3-4 months to notice a difference  If you are a smoker, stop smoking. You can call 8DataMotionNOW for the [place] tobacco quit line support. If you do not smoke, but are exposed to smokers, avoid secondhand smoke  Wear a proper fitting and supportive bra without wire  Compresses may be helpful. Apply warm compresses, ice packs or gentle massage  Medical Therapy: Over the counter Acetaminophen or a nonsteroidal anti-inflammatory drug such as Ibuprofen can be helpful as needed    You can view your health information, review clinical summaries from office visits and test results online when you follow your health with My Chart personal health care record. To sign up, go to [email or web address].     My office phone number is [phone] if you need to get in touch with me or have additional questions or concerns. Thank you for choosing [facility] and trusting me as your healthcare provider. I look forward to seeing you again at your next office visit. I am honored to be a provider on your health care team and I remain dedicated to helping you achieve your health goals.

## 2025-01-09 ENCOUNTER — APPOINTMENT (OUTPATIENT)
Dept: CARDIOLOGY | Facility: HOSPITAL | Age: 30
End: 2025-01-09
Payer: COMMERCIAL

## 2025-01-09 ENCOUNTER — HOSPITAL ENCOUNTER (EMERGENCY)
Facility: HOSPITAL | Age: 30
Discharge: HOME | End: 2025-01-09
Payer: COMMERCIAL

## 2025-01-09 ENCOUNTER — APPOINTMENT (OUTPATIENT)
Dept: RADIOLOGY | Facility: HOSPITAL | Age: 30
End: 2025-01-09
Payer: COMMERCIAL

## 2025-01-09 VITALS
SYSTOLIC BLOOD PRESSURE: 120 MMHG | HEIGHT: 64 IN | HEART RATE: 90 BPM | WEIGHT: 147 LBS | DIASTOLIC BLOOD PRESSURE: 63 MMHG | BODY MASS INDEX: 25.1 KG/M2 | RESPIRATION RATE: 20 BRPM | TEMPERATURE: 99 F | OXYGEN SATURATION: 98 %

## 2025-01-09 DIAGNOSIS — J10.1 INFLUENZA A: ICD-10-CM

## 2025-01-09 DIAGNOSIS — R05.1 ACUTE COUGH: ICD-10-CM

## 2025-01-09 DIAGNOSIS — U07.1 COVID: Primary | ICD-10-CM

## 2025-01-09 LAB
ALBUMIN SERPL BCP-MCNC: 4.3 G/DL (ref 3.4–5)
ALP SERPL-CCNC: 50 U/L (ref 33–110)
ALT SERPL W P-5'-P-CCNC: 9 U/L (ref 7–45)
ANION GAP SERPL CALC-SCNC: 11 MMOL/L (ref 10–20)
AST SERPL W P-5'-P-CCNC: 11 U/L (ref 9–39)
ATRIAL RATE: 80 BPM
B-HCG SERPL-ACNC: <2 MIU/ML
BASOPHILS # BLD AUTO: 0.02 X10*3/UL (ref 0–0.1)
BASOPHILS NFR BLD AUTO: 0.5 %
BILIRUB SERPL-MCNC: 0.4 MG/DL (ref 0–1.2)
BUN SERPL-MCNC: 8 MG/DL (ref 6–23)
CALCIUM SERPL-MCNC: 9.9 MG/DL (ref 8.6–10.3)
CARDIAC TROPONIN I PNL SERPL HS: <3 NG/L (ref 0–13)
CHLORIDE SERPL-SCNC: 105 MMOL/L (ref 98–107)
CO2 SERPL-SCNC: 26 MMOL/L (ref 21–32)
CREAT SERPL-MCNC: 0.64 MG/DL (ref 0.5–1.05)
D DIMER PPP FEU-MCNC: <215 NG/ML FEU
EGFRCR SERPLBLD CKD-EPI 2021: >90 ML/MIN/1.73M*2
EOSINOPHIL # BLD AUTO: 0.07 X10*3/UL (ref 0–0.7)
EOSINOPHIL NFR BLD AUTO: 1.6 %
ERYTHROCYTE [DISTWIDTH] IN BLOOD BY AUTOMATED COUNT: 11.7 % (ref 11.5–14.5)
FLUAV RNA RESP QL NAA+PROBE: DETECTED
FLUBV RNA RESP QL NAA+PROBE: NOT DETECTED
GLUCOSE SERPL-MCNC: 98 MG/DL (ref 74–99)
HCT VFR BLD AUTO: 37.4 % (ref 36–46)
HGB BLD-MCNC: 12.8 G/DL (ref 12–16)
IMM GRANULOCYTES # BLD AUTO: 0.01 X10*3/UL (ref 0–0.7)
IMM GRANULOCYTES NFR BLD AUTO: 0.2 % (ref 0–0.9)
LYMPHOCYTES # BLD AUTO: 0.63 X10*3/UL (ref 1.2–4.8)
LYMPHOCYTES NFR BLD AUTO: 14.8 %
MCH RBC QN AUTO: 31.8 PG (ref 26–34)
MCHC RBC AUTO-ENTMCNC: 34.2 G/DL (ref 32–36)
MCV RBC AUTO: 93 FL (ref 80–100)
MONOCYTES # BLD AUTO: 0.44 X10*3/UL (ref 0.1–1)
MONOCYTES NFR BLD AUTO: 10.3 %
NEUTROPHILS # BLD AUTO: 3.1 X10*3/UL (ref 1.2–7.7)
NEUTROPHILS NFR BLD AUTO: 72.6 %
NRBC BLD-RTO: 0 /100 WBCS (ref 0–0)
P AXIS: 75 DEGREES
P OFFSET: 207 MS
P ONSET: 153 MS
PLATELET # BLD AUTO: 409 X10*3/UL (ref 150–450)
POTASSIUM SERPL-SCNC: 3.7 MMOL/L (ref 3.5–5.3)
PR INTERVAL: 132 MS
PROT SERPL-MCNC: 7.2 G/DL (ref 6.4–8.2)
Q ONSET: 219 MS
QRS COUNT: 13 BEATS
QRS DURATION: 88 MS
QT INTERVAL: 386 MS
QTC CALCULATION(BAZETT): 445 MS
QTC FREDERICIA: 425 MS
R AXIS: 59 DEGREES
RBC # BLD AUTO: 4.02 X10*6/UL (ref 4–5.2)
RSV RNA RESP QL NAA+PROBE: NOT DETECTED
S PYO DNA THROAT QL NAA+PROBE: NOT DETECTED
SARS-COV-2 RNA RESP QL NAA+PROBE: DETECTED
SODIUM SERPL-SCNC: 138 MMOL/L (ref 136–145)
T AXIS: 62 DEGREES
T OFFSET: 412 MS
VENTRICULAR RATE: 80 BPM
WBC # BLD AUTO: 4.3 X10*3/UL (ref 4.4–11.3)

## 2025-01-09 PROCEDURE — 87651 STREP A DNA AMP PROBE: CPT

## 2025-01-09 PROCEDURE — 71046 X-RAY EXAM CHEST 2 VIEWS: CPT | Mod: FOREIGN READ | Performed by: RADIOLOGY

## 2025-01-09 PROCEDURE — 87637 SARSCOV2&INF A&B&RSV AMP PRB: CPT

## 2025-01-09 PROCEDURE — 80053 COMPREHEN METABOLIC PANEL: CPT

## 2025-01-09 PROCEDURE — 99285 EMERGENCY DEPT VISIT HI MDM: CPT | Mod: 25

## 2025-01-09 PROCEDURE — 36415 COLL VENOUS BLD VENIPUNCTURE: CPT

## 2025-01-09 PROCEDURE — 84484 ASSAY OF TROPONIN QUANT: CPT

## 2025-01-09 PROCEDURE — 2500000001 HC RX 250 WO HCPCS SELF ADMINISTERED DRUGS (ALT 637 FOR MEDICARE OP)

## 2025-01-09 PROCEDURE — 85025 COMPLETE CBC W/AUTO DIFF WBC: CPT

## 2025-01-09 PROCEDURE — 84702 CHORIONIC GONADOTROPIN TEST: CPT

## 2025-01-09 PROCEDURE — 93005 ELECTROCARDIOGRAM TRACING: CPT

## 2025-01-09 PROCEDURE — 71046 X-RAY EXAM CHEST 2 VIEWS: CPT

## 2025-01-09 PROCEDURE — 85379 FIBRIN DEGRADATION QUANT: CPT

## 2025-01-09 RX ORDER — IBUPROFEN 600 MG/1
600 TABLET ORAL EVERY 6 HOURS PRN
Qty: 25 TABLET | Refills: 0 | Status: SHIPPED | OUTPATIENT
Start: 2025-01-09 | End: 2025-01-16

## 2025-01-09 RX ORDER — BENZONATATE 100 MG/1
100 CAPSULE ORAL EVERY 8 HOURS
Qty: 9 CAPSULE | Refills: 0 | Status: SHIPPED | OUTPATIENT
Start: 2025-01-09 | End: 2025-01-12

## 2025-01-09 RX ORDER — ACETAMINOPHEN 325 MG/1
975 TABLET ORAL ONCE
Status: COMPLETED | OUTPATIENT
Start: 2025-01-09 | End: 2025-01-09

## 2025-01-09 RX ORDER — ACETAMINOPHEN 500 MG
1000 TABLET ORAL EVERY 8 HOURS PRN
Qty: 18 TABLET | Refills: 0 | Status: SHIPPED | OUTPATIENT
Start: 2025-01-09 | End: 2025-01-12

## 2025-01-09 RX ADMIN — ACETAMINOPHEN 975 MG: 325 TABLET, FILM COATED ORAL at 10:07

## 2025-01-09 ASSESSMENT — COLUMBIA-SUICIDE SEVERITY RATING SCALE - C-SSRS
6. HAVE YOU EVER DONE ANYTHING, STARTED TO DO ANYTHING, OR PREPARED TO DO ANYTHING TO END YOUR LIFE?: NO
1. IN THE PAST MONTH, HAVE YOU WISHED YOU WERE DEAD OR WISHED YOU COULD GO TO SLEEP AND NOT WAKE UP?: NO
2. HAVE YOU ACTUALLY HAD ANY THOUGHTS OF KILLING YOURSELF?: NO

## 2025-01-09 ASSESSMENT — PAIN SCALES - GENERAL
PAINLEVEL_OUTOF10: 1
PAINLEVEL_OUTOF10: 4

## 2025-01-09 ASSESSMENT — PAIN DESCRIPTION - PROGRESSION: CLINICAL_PROGRESSION: NOT CHANGED

## 2025-01-09 ASSESSMENT — PAIN DESCRIPTION - FREQUENCY: FREQUENCY: INTERMITTENT

## 2025-01-09 ASSESSMENT — PAIN - FUNCTIONAL ASSESSMENT: PAIN_FUNCTIONAL_ASSESSMENT: 0-10

## 2025-01-09 ASSESSMENT — PAIN DESCRIPTION - LOCATION: LOCATION: THROAT

## 2025-01-09 ASSESSMENT — PAIN DESCRIPTION - DESCRIPTORS: DESCRIPTORS: ACHING

## 2025-01-09 ASSESSMENT — PAIN DESCRIPTION - ORIENTATION: ORIENTATION: MID

## 2025-01-09 ASSESSMENT — PAIN DESCRIPTION - PAIN TYPE: TYPE: ACUTE PAIN

## 2025-01-09 ASSESSMENT — PAIN DESCRIPTION - ONSET: ONSET: GRADUAL

## 2025-01-09 NOTE — ED PROVIDER NOTES
HPI   Chief Complaint   Patient presents with    Flu Symptoms    Sore Throat       29-year-old female presents to the ED today with a chief concern of flulike symptoms.  Patient reports that for the past 5 days she has had a cough.  She reports that today she noticed that she had some slight bright red blood in her sputum when she coughed once.  She denies any fevers.  Reports rhinorrhea, nasal congestion, and cough over the past couple days.  She denies any chest pain but does report some pain with inspiration.  She denies any nausea or vomiting.  She denies any leg swelling.  Is not on birth control.  Denies any recent travel or surgeries.  No history of PE or DVT.  No abdominal pain.  She denies any sore throat.  Does not feel short of breath.  She has been taking over-the-counter medications for her symptoms as needed.  She has no other symptoms or concerns at this time.      History provided by:  Patient   used: No            Patient History   Past Medical History:   Diagnosis Date    Acute recurrent maxillary sinusitis 09/11/2019    Recurrent maxillary sinusitis    Chalazion right upper eyelid 01/14/2016    Chalazion of right upper eyelid    Encounter for screening for malignant neoplasm of vagina     Vaginal Pap smear    Injury of conjunctiva and corneal abrasion without foreign body, right eye, initial encounter 01/14/2016    Abrasion of cornea, right    Other conditions influencing health status     History of pregnancy    Other conditions influencing health status 06/27/2014    History of pregnancy    Other specified health status 09/16/2014    Contraception    Otitis media, unspecified, right ear 09/11/2019    Acute right otitis media    Personal history of other (healed) physical injury and trauma 01/14/2016    History of corneal abrasion    Personal history of other diseases of the respiratory system 06/25/2020    History of recurrent tonsillitis    Personal history of other  endocrine, nutritional and metabolic disease 06/25/2020    History of goiter    Personal history of other medical treatment     H/O mammogram    Personal history of other medical treatment     H/O bone density study    Tinea unguium 06/05/2020    Toenail fungus    Trochanteric bursitis, right hip 10/12/2021    Greater trochanteric bursitis, right    Unspecified acute conjunctivitis, left eye 10/02/2017    Acute bacterial conjunctivitis of left eye     Past Surgical History:   Procedure Laterality Date    COLONOSCOPY  03/17/2014    Complete Colonoscopy     Family History   Problem Relation Name Age of Onset    Diabetes Mother Ana Cristina Henriquez     Diabetes Father      Diabetes Brother Sher Saenz     Lung cancer Father's Brother      Breast cancer Maternal Grandmother Cheyanne Saenz     Bone cancer Maternal Grandfather      Stroke Paternal Grandmother Kim Henriquez      Social History     Tobacco Use    Smoking status: Never    Smokeless tobacco: Never   Substance Use Topics    Alcohol use: Yes     Alcohol/week: 1.0 standard drink of alcohol     Types: 1 Glasses of wine per week     Comment: Not every week    Drug use: Never       Physical Exam   ED Triage Vitals [01/09/25 0912]   Temperature Heart Rate Respirations BP   37.2 °C (99 °F) 90 20 120/63      Pulse Ox Temp Source Heart Rate Source Patient Position   98 % Temporal Monitor Sitting      BP Location FiO2 (%)     Left arm --       Physical Exam  Gen.: Vitals noted no distress afebrile. Normal phonation, no stridor, no trismus. Patient is handling secretions well without any tripod positioning or drooling.  ENT: TMs clear bilaterally, EACs unremarkable. Mastoids nontender. Posterior oropharynx without erythema, exudate, or swelling. Uvula is in the midline and nonedematous. No Andrés's Angina.   Neck: Supple. No meningismus through full range of motion. No lymphadenopathy.   Cardiac: Regular rate rhythm no murmur.   Lungs: Good aeration throughout. No adventitious  breath sounds.   Abdomen: Soft nontender nonsurgical throughout  Extremities: No peripheral edema. extremities are moist with good skin turgor.  Skin: No rash.   Neuro: No focal neurologic deficits. cranial nerves II-XII grossly intact.       ED Course & [facility]   ED Course as of 01/09/25 1144   Thu Jan 09, 2025   1040 I personally interpreted the chest x-ray.  Chest x-ray shows no evidence of pneumonia or pneumothorax.  Final disposition and treatment pending radiology read []   1046 IMPRESSION:  No acute radiographic chest finding.  Signed by Enrrique Gotti MD   [MC]   1103 I personally interpreted the EKG.  EKG shows ventricular rate 80 bpm.  OH interval 132 ms.  QRS duration 88 ms.  QT/QTc 386/445 ms.  Patient is in normal sinus rhythm at a rate of 80 bpm.  Normal axis.  There is good R wave progression.  No right or left bundle-branch block.  No ST elevations.  Compared with previous EKG December 10, 2021 grossly unchanged. []   1124 Flu A Result(!): Detected []   1124 Coronavirus 2019, PCR(!): Detected [MC]   1135 CBC shows no evidence of leukocytosis or anemia.  Mildly leukopenic with a white blood cell count of 4.3.  CMP shows normal kidney and liver function.  Electrolytes normal.  hCG, troponin, and D-dimer normal. [MC]   1136 D-Dimer, Quantitative VTE Exclusion: <215 []      ED Course User Index  [MC] Joey Rubio PA-C         Diagnoses as of 01/09/25 1144   COVID   Influenza A   Acute cough                 No data recorded     [place] Coma Scale Score: 15 (01/09/25 1011 : Brittany Gonzalez RN)                           Medical Decision Making  29-year-old female presents to the ED today with a chief concern of flulike symptoms.  Vital signs reassuring.  Patient overall appears well and is nontoxic-appearing. Patient has full range of motion of the neck without any meningismus.  No evidence of meningitis.  Satting well on room air.  Not hypoxic.  Not tachycardic.  Afebrile.  Patient tested positive for  influenza A and COVID.  Her strep is negative.  She has no risk factors for PE.  Her PERC is 0.  Her D-dimer is under 215.  EKG shows no ischemic changes and troponin is stable.  Her EKG shows no S1Q3T3.  No evidence on physical exam for a PE.  Her chest x-ray shows no evidence of pneumonia.  She did test positive for COVID and influenza A.  She is out of the window for Tamiflu.  Do not think Paxlovid is indicated at this time.  Will treat outpatient with Tylenol, ibuprofen, and Tessalon Perles.  Discussed uses and possible side effects of these medications.  Discussed with patient that for chest pain worsens she should come back to the ED immediately for further evaluation and treatment.  Did consider doing a CT PE study however with minimal risk factors and negative D-dimer do not think this is indicated at this time.  Discussed my pression and findings with patient she feels comfortable returning home.  We discussed very strict return precautions including returning for any new or worsening signs or symptoms.  Patient is in agreement with this plan.  She will follow-up with her PCP within 3 days.  Again discussed with patient importance of returning to the ED if chest pain worsens or continues.    Differential diagnosis: RSV, COVID, influenza, group A strep, PTA, retropharyngeal abscess, meningitis, pneumonia, PE, ACS, bronchitis    Disposition/treatment  1.  See diagnosis    Shared decision-making was used patient feels comfortable returning home     Patient was advised to follow up with recommended provider in 1 day for another evaluation and exam. I advised patient/guardian to return or go to closest emergency room immediately if symptoms change, get worse, new symptoms develop prior to follow up. If there is no improvement in symptoms in the next 24 hours they are advised to return for further evaluation and exam. I also explained the plan and treatment course. Patient/guardian is in agreement with plan,  treatment course, and follow up and states verbally that they will comply.    Patient is homegoing. I discussed the differential; results and discharge plan with the patient and/or family/friend/caregiver if present.  I emphasized the importance of follow-up with the physician I referred them to in the timeframe recommended.  I explained reasons for the patient to return to the Emergency Department. They agreed that if they feel their condition is worsening or if they have any other concern they should call 911 immediately for further assistance. I gave the patient an opportunity to ask all questions they had and answered all of them accordingly. They understand return precautions and discharge instructions. The patient and/or family/friend/caregiver expressed understanding verbally and that they would comply.        This note has been transcribed using voice recognition and may contain grammatical errors, misplaced words, incorrect words, incorrect phrases or other errors.        Procedure  Procedures     Joey Rubio PA-C  01/09/25 1147

## 2025-01-09 NOTE — Clinical Note
Cheyanne Henriquez was seen and treated in our emergency department on 1/9/2025.  She may return to work on 01/11/2025.       If you have any questions or concerns, please don't hesitate to call.      Joey Rubio PA-C

## 2025-01-09 NOTE — ED TRIAGE NOTES
Patient presents to ED from home for hemoptysis and cough with fever and chills. Patient states it is scant, bright red blood without clotting. The coughing started on Sunday and has gotten progressively worse. Patient states her throat hurts when she coughs as well. No pain other than throat.

## 2025-01-19 LAB
ATRIAL RATE: 80 BPM
P AXIS: 75 DEGREES
P OFFSET: 207 MS
P ONSET: 153 MS
PR INTERVAL: 132 MS
Q ONSET: 219 MS
QRS COUNT: 13 BEATS
QRS DURATION: 88 MS
QT INTERVAL: 386 MS
QTC CALCULATION(BAZETT): 445 MS
QTC FREDERICIA: 425 MS
R AXIS: 59 DEGREES
T AXIS: 62 DEGREES
T OFFSET: 412 MS
VENTRICULAR RATE: 80 BPM

## 2025-02-25 ENCOUNTER — APPOINTMENT (OUTPATIENT)
Dept: OPHTHALMOLOGY | Facility: CLINIC | Age: 30
End: 2025-02-25
Payer: COMMERCIAL

## 2025-02-27 LAB
EST. AVERAGE GLUCOSE BLD GHB EST-MCNC: 111 MG/DL
EST. AVERAGE GLUCOSE BLD GHB EST-SCNC: 6.2 MMOL/L
GLUCOSE P FAST SERPL-MCNC: 91 MG/DL (ref 65–99)
GLUCOSE SP1 P CHAL SERPL-MCNC: 117 MG/DL
GLUCOSE SP2 P CHAL SERPL-MCNC: 94 MG/DL
GLUCOSE SP3 P CHAL SERPL-MCNC: 54 MG/DL
HBA1C MFR BLD: 5.5 % OF TOTAL HGB
SERVICE CMNT-IMP: ABNORMAL
SPECIMEN DRAWN SERPL: ABNORMAL

## 2025-03-17 ENCOUNTER — APPOINTMENT (OUTPATIENT)
Dept: NEUROLOGY | Facility: CLINIC | Age: 30
End: 2025-03-17
Payer: COMMERCIAL

## 2025-03-17 VITALS
DIASTOLIC BLOOD PRESSURE: 66 MMHG | BODY MASS INDEX: 24.55 KG/M2 | HEART RATE: 66 BPM | RESPIRATION RATE: 16 BRPM | SYSTOLIC BLOOD PRESSURE: 117 MMHG | WEIGHT: 143 LBS

## 2025-03-17 DIAGNOSIS — G57.11 MERALGIA PARAESTHETICA, RIGHT: Primary | ICD-10-CM

## 2025-03-17 PROCEDURE — 99215 OFFICE O/P EST HI 40 MIN: CPT | Performed by: PSYCHIATRY & NEUROLOGY

## 2025-03-17 RX ORDER — GABAPENTIN 100 MG/1
100 CAPSULE ORAL 3 TIMES DAILY
Qty: 270 CAPSULE | Refills: 3 | Status: SHIPPED | OUTPATIENT
Start: 2025-03-17 | End: 2026-03-17

## 2025-03-17 NOTE — PROGRESS NOTES
Date of Service: 3/17/2025  Patient: Cheyanne Henriquez  MRN: [id number]    Diagnosis:   meralgia paresthetica, right    Impression:     Cheyanne Henriquez is a 29 y.o. with right sided meralgia paresthetica with an initial onset 3 years ago aggravated by prolonged standing on her feet. She had recent diabetic workup that was negative. She has no other risk factors such as obesity, recent pregnancy, tight pants or belt. She has tried lidocaine patches in the past with minimal relief. Heat, ibuprofen, and Tiger's balm take the edge off the pain when it is unbearable, however she is in quite of bit of pain during the day when she is on her feet at work. Still unclear etiology, and given the lack of risk factors and diabetes being ruled out, we will look further to see if there is any physical impediment of the lateral femoral cutaneous nerve.     Plan:   -Neuromuscular ultrasound of lateral femoral cutaneous nerve  -CT of the abdomen to assess for any mass given no risk factors of obesity, recent pregnancy, or diabetes   -RFP ordered for CT with contrast  -Start gabapentin at 100 mg three times daily. Call in 3-4 weeks to update us on how this is managing the pain.   -Will follow-up after imaging    CHASE Walden-CNP     ATTENDING NOTE - KALYAN YANES M.D.    I saw patient with the nurse practitioner and agree with the edits, history and exam that I helped formulate per above.    She returns today with persistent sensory symptoms and pain in the distribution of the right lateral cutaneous nerve of the thigh.  She tried lidocaine patches with no help.  She is using menthol products.  The pain has been significant.  She denies any other new symptoms.  Her neurological examination continues to show well circumscribed area of sensory loss in the distribution of the lateral tongue potential.    In summary, Ms. Cheyanne Henriquez has had a 3-year history of right meralgia paresthetica with no risk factor including  obesity, pregnancy, diabetes or external compressions.  She has failed lidocaine patch.  We will start her on gabapentin 100 mg 3 times daily and plan to increase this if necessary.  We also ordered neuromuscular ultrasound to assess the lateral femoral cutaneous nerve and a CT scan of the abdomen/pelvis to assess the lateral femoral cutaneous nerve in the pelvis.    We discussed this with her in details we will review the tests and give her a call.    Lyn Holly M.D., F.A.C.P.   Director, [facility] & [facility] laboratory   [facility]   [facility]   Professor of Neurology   [facility] [employer]    The total appointment time today was 40 minutes. Time included preparing to see the patient, obtaining the history, performing a medically necessary appropriate physical examination, counseling and educating the patient, ordering tests, referring and communicating with other providers, independently interpreting results  to the patient/family and documenting clinical information in the medical record.    History of Present Illness:    Ms. Henriquez is a 29 y.o. female with right meralgia paresthetica that was initially seen in 8/22/24. She is here for follow-up after getting diabetic workup. She arrived alone.     She states that this issues has been ongoing for 3 years and has continued to worsen over the past year. She started a second job at [employer] 2 summers ago where she is constantly on her feet aggravating the pain in her right leg. 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 She has had no weight gain or weight loss. She has had no recent pregnancy. She wears loose fitting clothing like scrubs most of the time. She denies wearing a belt or anything around her waist. She as received a steroid injection in her hip in the past with no relief.     Since we last saw  her, she has tried lidocaine patches and cream which she states does not help the pain much. She has found that heat and tiger's balm (camphor and menthol) have helped take the edge off the pain when it is unbearable. She take PRN ibuprofen a couple times a week which she states does help the pain. She recently had A1C and glucose tolerance test which was not concerning for diabetes or insulin resistance. She denies any symptoms on the left side.     Otherwise, the past medical history, social history, and review of systems were reviewed. There are no significant changes.   Neuromuscular Exam:      /66 (BP Location: Right arm)   Pulse 66   Resp 16   Wt 64.9 kg (143 lb)   BMI 24.55 kg/m²      General Appearance:  No distress, alert, interactive and cooperative.     Motor:   Muscle bulk: Normal throughout.  Muscle tone: Normal in both upper and lower extremities.  Movements: No fasciculations, tremors, or other abnormal movement.      Manual Muscle Testing (MMT) reveals the following MRC grades:  Neck Flexion 5  Neck Extension 5  R          L   Shoulder abduction                 5          5  Elbow flexion                           5          5  Elbow extension                      5          5  Finger flexion                           5          5  Finger extension                      5          5  Finger abduction                     5          5  Thumb abduction                    5          5  Hip flexion                               5          5  Hip extension                          5          5  Knee flexion                             5          5  Knee extension                        5          5  Ankle dorsiflexion                    5          5  Ankle plantarflexion                5          5    Reflexes:     R          L  BR:               2          2  Biceps:         2          2  Triceps:        2          2  Knee:           3          3  Ankle:          2          2     Babinski: Toes  are down going  No clonus or other pathological reflexes     Sensory:   In bilateral upper extremities, intact sensation to light touch.     Bilateral lower extremities, intact sensation to light touch, except reduced sensation to touch within a demarcated area drawn by patient covering the right anterior lateral thigh that is supplied by the lateral femoral cutaneous nerve. The surrounding area sensation is intact to light touch.     Results:   We personally reviewed the most recent lab work:  Lab Results   Component Value Date    HGBA1C 5.5 02/26/2025

## 2025-04-02 ENCOUNTER — APPOINTMENT (OUTPATIENT)
Dept: PRIMARY CARE | Facility: CLINIC | Age: 30
End: 2025-04-02
Payer: COMMERCIAL